# Patient Record
Sex: FEMALE | Race: WHITE | NOT HISPANIC OR LATINO | Employment: UNEMPLOYED | URBAN - METROPOLITAN AREA
[De-identification: names, ages, dates, MRNs, and addresses within clinical notes are randomized per-mention and may not be internally consistent; named-entity substitution may affect disease eponyms.]

---

## 2017-01-13 ENCOUNTER — OFFICE VISIT (OUTPATIENT)
Dept: OBSTETRICS AND GYNECOLOGY | Facility: CLINIC | Age: 42
End: 2017-01-13
Payer: MEDICAID

## 2017-01-13 ENCOUNTER — TELEPHONE (OUTPATIENT)
Dept: OBSTETRICS AND GYNECOLOGY | Facility: CLINIC | Age: 42
End: 2017-01-13

## 2017-01-13 ENCOUNTER — HOSPITAL ENCOUNTER (OUTPATIENT)
Dept: RADIOLOGY | Facility: CLINIC | Age: 42
Discharge: HOME | End: 2017-01-13
Attending: OBSTETRICS & GYNECOLOGY
Payer: MEDICAID

## 2017-01-13 VITALS
BODY MASS INDEX: 18.24 KG/M2 | SYSTOLIC BLOOD PRESSURE: 124 MMHG | WEIGHT: 120.38 LBS | DIASTOLIC BLOOD PRESSURE: 74 MMHG | HEIGHT: 68 IN

## 2017-01-13 DIAGNOSIS — Z12.31 VISIT FOR SCREENING MAMMOGRAM: ICD-10-CM

## 2017-01-13 DIAGNOSIS — A63.0 GENITAL WARTS: ICD-10-CM

## 2017-01-13 DIAGNOSIS — Z12.39 BREAST CANCER SCREENING: ICD-10-CM

## 2017-01-13 DIAGNOSIS — N94.6 DYSMENORRHEA: ICD-10-CM

## 2017-01-13 DIAGNOSIS — Z01.419 ROUTINE GYNECOLOGICAL EXAMINATION: Primary | ICD-10-CM

## 2017-01-13 DIAGNOSIS — Z11.51 SCREENING FOR HPV (HUMAN PAPILLOMAVIRUS): ICD-10-CM

## 2017-01-13 PROCEDURE — 77067 SCR MAMMO BI INCL CAD: CPT | Mod: 26,,, | Performed by: RADIOLOGY

## 2017-01-13 PROCEDURE — 77067 SCR MAMMO BI INCL CAD: CPT | Mod: TC

## 2017-01-13 PROCEDURE — 77063 BREAST TOMOSYNTHESIS BI: CPT | Mod: 26,,, | Performed by: RADIOLOGY

## 2017-01-13 PROCEDURE — 87624 HPV HI-RISK TYP POOLED RSLT: CPT

## 2017-01-13 PROCEDURE — 99999 PR PBB SHADOW E&M-NEW PATIENT-LVL III: CPT | Mod: PBBFAC,,, | Performed by: OBSTETRICS & GYNECOLOGY

## 2017-01-13 PROCEDURE — 88175 CYTOPATH C/V AUTO FLUID REDO: CPT

## 2017-01-13 PROCEDURE — 99385 PREV VISIT NEW AGE 18-39: CPT | Mod: S$PBB,,, | Performed by: OBSTETRICS & GYNECOLOGY

## 2017-01-13 RX ORDER — BUPROPION HYDROCHLORIDE 150 MG/1
300 TABLET ORAL DAILY
COMMUNITY

## 2017-01-13 RX ORDER — PRAZOSIN HYDROCHLORIDE 1 MG/1
1 CAPSULE ORAL 2 TIMES DAILY
COMMUNITY

## 2017-01-13 RX ORDER — IMIQUIMOD 12.5 MG/.25G
CREAM TOPICAL
Qty: 12 PACKET | Refills: 0 | Status: SHIPPED | OUTPATIENT
Start: 2017-01-13 | End: 2018-01-13

## 2017-01-13 NOTE — TELEPHONE ENCOUNTER
----- Message from Augusta Amezcua sent at 1/13/2017 12:53 PM CST -----  Contact: Patient  Patient wants to know if you called a prescription for the lotion for HPV . Please call patient at [phone] to advise.

## 2017-01-13 NOTE — PROGRESS NOTES
Chief Complaint   Patient presents with    Gynecologic Exam       History of Present Illness: Roseanna Pan is a 41 y.o. female that presents today 2017 for well gyn visit.    Past Medical History   Diagnosis Date    Genital warts        Past Surgical History   Procedure Laterality Date    Tubal ligation       section      Leg surgery      Pelvic laparoscopy      Tonsilectomy          Current Outpatient Prescriptions   Medication Sig Dispense Refill    buPROPion (WELLBUTRIN XL) 150 MG TB24 tablet Take 300 mg by mouth once daily.      prazosin (MINIPRESS) 1 MG Cap Take 1 mg by mouth 2 (two) times daily.      imiquimod (ALDARA) 5 % cream Apply to affected area three times weekly 12 packet 0     No current facility-administered medications for this visit.        Review of patient's allergies indicates:  No Known Allergies    Family History   Problem Relation Age of Onset    Breast cancer Maternal Aunt     Ovarian cancer Neg Hx        Social History     Social History    Marital status:      Spouse name: N/A    Number of children: N/A    Years of education: N/A     Social History Main Topics    Smoking status: Current Every Day Smoker    Smokeless tobacco: None    Alcohol use No    Drug use: No    Sexual activity: Not Currently     Partners: Male     Other Topics Concern    None     Social History Narrative    None       OB History    Para Term  AB SAB TAB Ectopic Multiple Living   3 3              # Outcome Date GA Lbr Chauncey/2nd Weight Sex Delivery Anes PTL Lv   3 Para 12           2 Para            1 Para                   Review of Symptoms:  GENERAL: Denies weight gain or weight loss. Feeling well overall.   SKIN: Denies rash or lesions.   HEAD: Denies head injury or headache.   NODES: Denies enlarged lymph nodes.   CHEST: Denies chest pain or shortness of breath.   CARDIOVASCULAR: Denies palpitations or left sided chest pain.   ABDOMEN: No abdominal  "pain, constipation, diarrhea, nausea, vomiting or rectal bleeding.   URINARY: No frequency, dysuria, hematuria, or burning on urination.  HEMATOLOGIC: No easy bruisability or excessive bleeding.   MUSCULOSKELETAL: Denies joint pain or swelling.     Visit Vitals    /74    Ht 5' 8" (1.727 m)    Wt 54.6 kg (120 lb 5.9 oz)    LMP 01/08/2017     Physical Exam:  APPEARANCE: Well nourished, well developed, in no acute distress.  SKIN: Normal skin turgor, no lesions.  NECK: Neck symmetric without masses   RESPIRATORY: Normal respiratory effort with no retractions or use of accessory muscles  CARDIOVASCULAR: Peripheral vascular system with no swelling no varicosities and palpation of pulses normal  LYMPHATIC: No enlargements of the lymph nodes noted in the neck, axillae, or groin  ABDOMEN: Soft. No tenderness or masses. No hepatosplenomegaly. No hernias.  BREASTS: Symmetrical, no skin changes or visible lesions. No palpable masses, nipple discharge or adenopathy bilaterally.  PELVIC: Normal external female genitalia with+++ warts like lesion on the mid mons. Normal hair distribution. Adequate perineal body, normal urethral meatus. Urethra with no masses.  Bladder nontender. Vagina moist and well rugated without lesions or discharge. Cervix pink and without lesions. No significant cystocele or rectocele. Bimanual exam showed uterus normal size, shape, position, mobile and nontender. Adnexa without masses or tenderness. Urethra and bladder normal. PAP DONE  EXTREMITIES: No clubbing cyanosis or edema.    ASSESSMENT/PLAN:  Routine gynecological examination  -     Liquid-based pap smear, screening    Screening for HPV (human papillomavirus)  -     HPV DNA probe, amplified    Genital warts  -     imiquimod (ALDARA) 5 % cream; Apply to affected area three times weekly  Dispense: 12 packet; Refill: 0    Dysmenorrhea    Breast cancer screening  -     Mammo Digital Screening Bilat With CAD; Future; Expected date: " 1/13/17          Patient was counseled today on Pap guidelines, recommendation for pelvic exams, mammograms every other year after the age of 40 and annually after the age of 50, Colonoscopy after the age of 50, Dexa Bone Scan and calcium and vitamin D supplementation in menopause and to see her PCP for other health maintenance.   FOLLOW-UP:prn

## 2017-01-13 NOTE — MR AVS SNAPSHOT
"    [facility] - OB/GYN  [address] [place] [address]  Phone: [phone]                  Roseanna Pan   2017 10:00 AM   Office Visit    Description:  Female : 1975   Provider:  Chantale Eduardo MD   Department:  [facility] - OB/[facility]           Reason for Visit     Gynecologic Exam           Diagnoses this Visit        Comments    Routine gynecological examination    -  Primary     Screening for HPV (human papillomavirus)                To Do List           Goals (5 Years of Data)     None      [facility]     [facility] Care [facility] -  Assistance  Registered nurses in the [facility] Call Center provide clinical advisement, health education, appointment booking, and other advisory services.  Call for this free service at [phone].             Medications           Message regarding Medications     Verify the changes and/or additions to your medication regime listed below are the same as discussed with your clinician today.  If any of these changes or additions are incorrect, please notify your healthcare provider.             Verify that the below list of medications is an accurate representation of the medications you are currently taking.  If none reported, the list may be blank. If incorrect, please contact your healthcare provider. Carry this list with you in case of emergency.           Current Medications     buPROPion (WELLBUTRIN XL) 150 MG TB24 tablet Take 300 mg by mouth once daily.    prazosin (MINIPRESS) 1 MG Cap Take 1 mg by mouth 2 (two) times daily.           Clinical Reference Information           Vital Signs - Last Recorded  Most recent update: 2017 10:11 AM by Mikayla Tong LPN    BP Ht Wt LMP BMI    124/74 5' 8" (1.727 m) 54.6 kg (120 lb 5.9 oz) 2017 18.3 kg/m2      Blood Pressure          Most Recent Value    BP  124/74      Allergies as of 2017     No Known Allergies      Immunizations Administered on Date of " Encounter - 1/13/2017     None      Orders Placed During Today's Visit      Normal Orders This Visit    HPV DNA probe, amplified     Liquid-based pap smear, screening       Smoking Cessation     If you would like to quit smoking:   You may be eligible for free services if you are a [place] resident and started smoking cigarettes before September 1, 1988.  Call the [employer]oking [employer]essation [employer] ([employer]) toll free at [phone] [phone] or (435) 284-9711.   Call 4-800-QUIT-NOW if you do not meet the above criteria.

## 2017-01-20 LAB — HUMAN PAPILLOMAVIRUS (HPV): NOT DETECTED

## 2017-01-27 ENCOUNTER — OFFICE VISIT (OUTPATIENT)
Dept: OBSTETRICS AND GYNECOLOGY | Facility: CLINIC | Age: 42
End: 2017-01-27
Payer: MEDICAID

## 2017-01-27 VITALS — HEIGHT: 68 IN | WEIGHT: 123.88 LBS | BODY MASS INDEX: 18.77 KG/M2

## 2017-01-27 DIAGNOSIS — A59.9 TRICHOMONAS VAGINALIS INFECTION: Primary | ICD-10-CM

## 2017-01-27 LAB
CANDIDA RRNA VAG QL PROBE: NEGATIVE
G VAGINALIS RRNA GENITAL QL PROBE: POSITIVE
T VAGINALIS RRNA GENITAL QL PROBE: POSITIVE

## 2017-01-27 PROCEDURE — 99999 PR PBB SHADOW E&M-EST. PATIENT-LVL III: CPT | Mod: PBBFAC,,, | Performed by: OBSTETRICS & GYNECOLOGY

## 2017-01-27 PROCEDURE — 99213 OFFICE O/P EST LOW 20 MIN: CPT | Mod: PBBFAC,PO | Performed by: OBSTETRICS & GYNECOLOGY

## 2017-01-27 PROCEDURE — 87480 CANDIDA DNA DIR PROBE: CPT

## 2017-01-27 PROCEDURE — 99213 OFFICE O/P EST LOW 20 MIN: CPT | Mod: S$PBB,,, | Performed by: OBSTETRICS & GYNECOLOGY

## 2017-01-27 RX ORDER — LAMOTRIGINE 100 MG/1
TABLET ORAL
COMMUNITY
Start: 2016-12-13

## 2017-01-27 RX ORDER — METRONIDAZOLE 500 MG/1
2000 TABLET ORAL ONCE
Qty: 4 TABLET | Refills: 0 | Status: SHIPPED | OUTPATIENT
Start: 2017-01-27 | End: 2017-01-27

## 2017-01-27 RX ORDER — LAMOTRIGINE 200 MG/1
TABLET ORAL
COMMUNITY
Start: 2017-01-12

## 2017-01-27 RX ORDER — PREDNISONE 10 MG/1
TABLET ORAL
COMMUNITY
Start: 2016-12-15

## 2017-01-27 NOTE — MR AVS SNAPSHOT
"    [facility] - OB/GYN  [address] [place] [address]  Phone: [phone]                  Roseanna Pan   2017 10:00 AM   Office Visit    Description:  Female : 1975   Provider:  Chantale Eduardo MD   Department:  [facility] - OB/GYN           Reason for Visit     Abnormal Pap Smear           Diagnoses this Visit        Comments    Trichomonas vaginalis infection    -  Primary            To Do List           Goals (5 Years of Data)     None      [facility]     [facility] -  Assistance  Registered nurses in the [facility] Call Center provide clinical advisement, health education, appointment booking, and other advisory services.  Call for this free service at [phone].             Medications           Message regarding Medications     Verify the changes and/or additions to your medication regime listed below are the same as discussed with your clinician today.  If any of these changes or additions are incorrect, please notify your healthcare provider.             Verify that the below list of medications is an accurate representation of the medications you are currently taking.  If none reported, the list may be blank. If incorrect, please contact your healthcare provider. Carry this list with you in case of emergency.           Current Medications     buPROPion (WELLBUTRIN XL) 150 MG TB24 tablet Take 300 mg by mouth once daily.    imiquimod (ALDARA) 5 % cream Apply to affected area three times weekly    lamotrigine (LAMICTAL) 100 MG tablet     lamotrigine (LAMICTAL) 200 MG tablet     prazosin (MINIPRESS) 1 MG Cap Take 1 mg by mouth 2 (two) times daily.    predniSONE (DELTASONE) 10 MG tablet            Clinical Reference Information           Vital Signs - Last Recorded  Most recent update: 2017 10:02 AM by Hoa Griffin LPN    Ht Wt LMP BMI       5' 8" (1.727 m) 56.2 kg (123 lb 14.4 oz) 2017 18.84 kg/m2       Allergies as " of 1/27/2017     No Known Allergies      Immunizations Administered on Date of Encounter - 1/27/2017     None      Smoking Cessation     If you would like to quit smoking:   You may be eligible for free services if you are a [place] resident and started smoking cigarettes before September 1, 1988.  Call the Smoking Cessation Trust ([employer]CT) toll free at [phone] or [phone] [phone].   Call 5-800-QUIT-NOW if you do not meet the above criteria.

## 2017-01-27 NOTE — PROGRESS NOTES
Chief Complaint   Patient presents with    Abnormal Pap Smear     Trichomonas vaginalis present on pap       History of Present Illness: Roseanna Pan is a 41 y.o. female that presents today 2017 for   Chief Complaint   Patient presents with    Abnormal Pap Smear     Trichomonas vaginalis present on pap         Past Medical History   Diagnosis Date    Genital warts        Past Surgical History   Procedure Laterality Date    Tubal ligation       section      Leg surgery      Pelvic laparoscopy      Tonsilectomy          Current Outpatient Prescriptions   Medication Sig Dispense Refill    buPROPion (WELLBUTRIN XL) 150 MG TB24 tablet Take 300 mg by mouth once daily.      imiquimod (ALDARA) 5 % cream Apply to affected area three times weekly 12 packet 0    lamotrigine (LAMICTAL) 100 MG tablet       lamotrigine (LAMICTAL) 200 MG tablet       prazosin (MINIPRESS) 1 MG Cap Take 1 mg by mouth 2 (two) times daily.      predniSONE (DELTASONE) 10 MG tablet       metronidazole (FLAGYL) 500 MG tablet Take 4 tablets (2,000 mg total) by mouth once. 4 tablet 0     No current facility-administered medications for this visit.        Review of patient's allergies indicates:  No Known Allergies    Family History   Problem Relation Age of Onset    Breast cancer Maternal Aunt     Ovarian cancer Neg Hx        Social History   Substance Use Topics    Smoking status: Current Every Day Smoker    Smokeless tobacco: None    Alcohol use No       OB History    Para Term  AB SAB TAB Ectopic Multiple Living   3 3              # Outcome Date GA Lbr Chauncey/2nd Weight Sex Delivery Anes PTL Lv   3 Para 12           2 Para            1 Para                   Review of Symptoms:  GENERAL: Denies weight gain or weight loss. Feeling well overall.   SKIN: Denies rash or lesions.   HEAD: Denies head injury or headache.   NODES: Denies enlarged lymph nodes.   CHEST: Denies chest pain or shortness of breath.  "  CARDIOVASCULAR: Denies palpitations or left sided chest pain.   ABDOMEN: No abdominal pain, constipation, diarrhea, nausea, vomiting or rectal bleeding.   URINARY: No frequency, dysuria, hematuria, or burning on urination.  HEMATOLOGIC: No easy bruisability or excessive bleeding.   MUSCULOSKELETAL: Denies joint pain or swelling.     Visit Vitals    Ht 5' 8" (1.727 m)    Wt 56.2 kg (123 lb 14.4 oz)    LMP 01/08/2017     Physical Exam:  APPEARANCE: Well nourished, well developed, in no acute distress.  SKIN: Normal skin turgor, no lesions.  NECK: Neck symmetric without masses   RESPIRATORY: Normal respiratory effort with no retractions or use of accessory muscles  CARDIOVASCULAR: Peripheral vascular system with no swelling no varicosities and palpation of pulses normal  LYMPHATIC: No enlargements of the lymph nodes noted in the neck, axillae, or groin  ABDOMEN: Soft. No tenderness or masses. No hepatosplenomegaly. No hernias.  PELVIC: Normal external female genitalia without lesions. Normal hair distribution. Adequate perineal body, normal urethral meatus. Urethra with no masses.  Bladder nontender. Vagina thin yellow discharge+++. No significant cystocele or rectocele. Bimanual exam showed uterus normal size, shape, position, mobile and nontender. Adnexa without masses or tenderness. Urethra and bladder normal.  EXTREMITIES: No clubbing cyanosis or edema.    ASSESSMENT/PLAN:  Trichomonas vaginalis infection  -     Vaginosis Screen by DNA Probe    Other orders  -     metronidazole (FLAGYL) 500 MG tablet; Take 4 tablets (2,000 mg total) by mouth once.  Dispense: 4 tablet; Refill: 0        15 minutes spent today with this patient. Greater than half spent in counseling today.     "

## 2017-01-30 ENCOUNTER — TELEPHONE (OUTPATIENT)
Dept: OBSTETRICS AND GYNECOLOGY | Facility: CLINIC | Age: 42
End: 2017-01-30

## 2017-01-30 RX ORDER — METRONIDAZOLE 500 MG/1
2000 TABLET ORAL ONCE
Qty: 4 TABLET | Refills: 0 | Status: SHIPPED | OUTPATIENT
Start: 2017-01-30 | End: 2017-01-30

## 2017-01-31 ENCOUNTER — TELEPHONE (OUTPATIENT)
Dept: OBSTETRICS AND GYNECOLOGY | Facility: CLINIC | Age: 42
End: 2017-01-31

## 2017-01-31 NOTE — TELEPHONE ENCOUNTER
Advised pt not to take second course of flagyl if she completed the one sent 1/27/17 per DR. Eduardo pt voiced understanding.

## 2017-01-31 NOTE — TELEPHONE ENCOUNTER
----- Message from Laquita Sirera sent at 1/31/2017  1:44 PM CST -----  Contact: pt [phone]  Patient called and asked for a call back about the prescription  Metronidazole  She wants to know if she needs to take this medication also.

## 2020-06-23 PROBLEM — M70.61 TROCHANTERIC BURSITIS OF RIGHT HIP: Status: ACTIVE | Noted: 2017-02-17
